# Patient Record
Sex: MALE | Race: WHITE | NOT HISPANIC OR LATINO | URBAN - METROPOLITAN AREA
[De-identification: names, ages, dates, MRNs, and addresses within clinical notes are randomized per-mention and may not be internally consistent; named-entity substitution may affect disease eponyms.]

---

## 2022-07-21 ENCOUNTER — APPOINTMENT (RX ONLY)
Dept: URBAN - METROPOLITAN AREA CLINIC 153 | Facility: CLINIC | Age: 47
Setting detail: DERMATOLOGY
End: 2022-07-21

## 2022-07-21 DIAGNOSIS — Z71.89 OTHER SPECIFIED COUNSELING: ICD-10-CM

## 2022-07-21 DIAGNOSIS — L73.2 HIDRADENITIS SUPPURATIVA: ICD-10-CM | Status: INADEQUATELY CONTROLLED

## 2022-07-21 PROCEDURE — ? TREATMENT REGIMEN

## 2022-07-21 PROCEDURE — ? PRESCRIPTION

## 2022-07-21 PROCEDURE — 99204 OFFICE O/P NEW MOD 45 MIN: CPT

## 2022-07-21 PROCEDURE — ? ADDITIONAL NOTES

## 2022-07-21 PROCEDURE — ? COUNSELING

## 2022-07-21 RX ORDER — PREDNISONE 10 MG/1
1 TABLET ORAL QD
Qty: 30 | Refills: 0 | Status: ERX | COMMUNITY
Start: 2022-07-21

## 2022-07-21 RX ORDER — DOXYCYCLINE 100 MG/1
1 CAPSULE ORAL BID
Qty: 28 | Refills: 0 | Status: ERX | COMMUNITY
Start: 2022-07-21

## 2022-07-21 RX ORDER — MUPIROCIN 20 MG/G
1 OINTMENT TOPICAL BID
Qty: 22 | Refills: 2 | Status: ERX | COMMUNITY
Start: 2022-07-21

## 2022-07-21 RX ADMIN — DOXYCYCLINE 1: 100 CAPSULE ORAL at 00:00

## 2022-07-21 RX ADMIN — PREDNISONE 1: 10 TABLET ORAL at 00:00

## 2022-07-21 RX ADMIN — MUPIROCIN 1: 20 OINTMENT TOPICAL at 00:00

## 2022-07-21 ASSESSMENT — LOCATION ZONE DERM
LOCATION ZONE: TRUNK
LOCATION ZONE: SCROTUM
LOCATION ZONE: GENITALIA
LOCATION ZONE: ANUS
LOCATION ZONE: PERINEUM
LOCATION ZONE: AXILLAE

## 2022-07-21 ASSESSMENT — LOCATION SIMPLE DESCRIPTION DERM
LOCATION SIMPLE: LEFT AXILLARY VAULT
LOCATION SIMPLE: RIGHT SCROTUM
LOCATION SIMPLE: PERINEUM
LOCATION SIMPLE: ANUS
LOCATION SIMPLE: RIGHT AXILLARY VAULT
LOCATION SIMPLE: GENITALIA
LOCATION SIMPLE: GROIN
LOCATION SIMPLE: GLUTEAL CLEFT
LOCATION SIMPLE: LEFT SCROTUM
LOCATION SIMPLE: LEFT UPPER BACK

## 2022-07-21 ASSESSMENT — LOCATION DETAILED DESCRIPTION DERM
LOCATION DETAILED: INFERIOR PERIANAL SKIN
LOCATION DETAILED: LEFT INFERIOR MEDIAL SCROTUM
LOCATION DETAILED: RIGHT INFERIOR MEDIAL SCROTUM
LOCATION DETAILED: GENITALIA
LOCATION DETAILED: LEFT INGUINAL CREASE
LOCATION DETAILED: RIGHT AXILLARY VAULT
LOCATION DETAILED: LEFT MEDIAL UPPER BACK
LOCATION DETAILED: RIGHT PERIANAL SKIN
LOCATION DETAILED: GLUTEAL CLEFT
LOCATION DETAILED: PERINEUM
LOCATION DETAILED: LEFT AXILLARY VAULT
LOCATION DETAILED: SUPRAPUBIC SKIN

## 2022-07-21 NOTE — PROCEDURE: TREATMENT REGIMEN
Initiate Treatment: doxycycline monohydrate 100 mg capsule PO Dose: 1 Frequency: BID Sig: Take one cap twice daily for 2 weeks\\nQuantity: 28 Capsule\\nmupirocin 2 % topical ointment TP Dose: 1 Frequency: BID Si\\nQuantity: 22 g Days Supply: 30\\nprednisone 10 mg tablet PO Dose: 1 Frequency: qd Sig: Take is to take 1 tablet a day x 4 days\\n3 tablets x 3 days\\n2 tablets x 2 days\\nand 1 tablet x 1 day\\nQuantity: 30 Tablet

## 2022-07-21 NOTE — HPI: RASH (HIDRADENITIS SUPPURATIVA)
How Severe Is It?: severe
Is This A New Presentation, Or A Follow-Up?: Rash
Additional History: Patient stated that he underwent clinical trials for a medication he does not know the name of with Dr. Shane Tran at [facility] in [place] but that he had a personal emergency and could not complete treatment. Patient also explains that he underwent several surgeries for the bilateral axilla as a teenager. Patient currently only uses Hibiclens wash and BC powder as a treatment, he does admit that he is a current smoker and that his pain level is a 10 of 10

## 2022-07-21 NOTE — PROCEDURE: TREATMENT REGIMEN
Plan: Patient has been instructed to follow up with Dr. Cornell in [place]  and has also been given the information for Dr. Tylor [facility]. All information will be forwarded to the above mentioned providers, and patient has been instructed to contact our office with any questions or concerns. Plan: Patient has been instructed to follow up with Dr. Cornell in [place]  and has also been given the information for Dr. Tylor [facility]. All information will be forwarded to the above mentioned providers, and patient has been instructed to contact our office with any questions or concerns.

## 2022-08-18 ENCOUNTER — APPOINTMENT (RX ONLY)
Dept: URBAN - METROPOLITAN AREA CLINIC 151 | Facility: CLINIC | Age: 47
Setting detail: DERMATOLOGY
End: 2022-08-18

## 2022-08-18 DIAGNOSIS — Z79.899 OTHER LONG TERM (CURRENT) DRUG THERAPY: ICD-10-CM

## 2022-08-18 DIAGNOSIS — L73.2 HIDRADENITIS SUPPURATIVA: ICD-10-CM | Status: INADEQUATELY CONTROLLED

## 2022-08-18 PROCEDURE — ? PRESCRIPTION MEDICATION MANAGEMENT

## 2022-08-18 PROCEDURE — ? ORDER TESTS

## 2022-08-18 PROCEDURE — 99214 OFFICE O/P EST MOD 30 MIN: CPT

## 2022-08-18 PROCEDURE — ? PRESCRIPTION

## 2022-08-18 PROCEDURE — ? COUNSELING

## 2022-08-18 PROCEDURE — ? HIGH RISK MEDICATION MONITORING

## 2022-08-18 RX ORDER — DOXYCYCLINE HYCLATE 100 MG/1
CAPSULE, GELATIN COATED ORAL BID
Qty: 60 | Refills: 5 | Status: ERX | COMMUNITY
Start: 2022-08-18

## 2022-08-18 RX ADMIN — DOXYCYCLINE HYCLATE: 100 CAPSULE, GELATIN COATED ORAL at 00:00

## 2022-08-18 ASSESSMENT — LOCATION ZONE DERM
LOCATION ZONE: GENITALIA
LOCATION ZONE: TRUNK
LOCATION ZONE: LEG
LOCATION ZONE: AXILLAE

## 2022-08-18 ASSESSMENT — LOCATION SIMPLE DESCRIPTION DERM
LOCATION SIMPLE: GROIN
LOCATION SIMPLE: GLUTEAL CLEFT
LOCATION SIMPLE: RIGHT AXILLARY VAULT
LOCATION SIMPLE: GENITALIA
LOCATION SIMPLE: ABDOMEN
LOCATION SIMPLE: LEFT THIGH
LOCATION SIMPLE: LEFT AXILLARY VAULT

## 2022-08-18 ASSESSMENT — LOCATION DETAILED DESCRIPTION DERM
LOCATION DETAILED: LEFT AXILLARY VAULT
LOCATION DETAILED: GENITALIA
LOCATION DETAILED: SUPRAPUBIC SKIN
LOCATION DETAILED: LEFT ANTERIOR PROXIMAL THIGH
LOCATION DETAILED: GLUTEAL CLEFT
LOCATION DETAILED: PERIUMBILICAL SKIN
LOCATION DETAILED: RIGHT AXILLARY VAULT

## 2022-08-18 NOTE — PROCEDURE: ORDER TESTS
Performing Laboratory: [id number]
Bill For Surgical Tray: no
Billing Type: Third-Party Bill
Expected Date Of Service: 08/18/2022
Clinical Notes (To The Lab): L73.2

## 2022-08-18 NOTE — PROCEDURE: HIGH RISK MEDICATION MONITORING
palpitations Clofazimine Pregnancy And Lactation Text: This medication is Pregnancy Category C and isn't considered safe during pregnancy. It is excreted in breast milk.

## 2022-08-18 NOTE — PROCEDURE: PRESCRIPTION MEDICATION MANAGEMENT
Render In Strict Bullet Format?: No
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 Patient states he has had surgery to remove tracts from the gluteal cleft as well. Patient states he has tried and failed doxycycline, clindamycin, and hibiclens with no success. Advised patient he is a good candidate for Humira. Advised patient Humira is a biologic and is an immunosuppressive medication but will help control inflammation. Patient will need lab monitoring and TB test before starting any biologic.
Detail Level: Zone

## 2023-03-07 ENCOUNTER — APPOINTMENT (RX ONLY)
Dept: URBAN - METROPOLITAN AREA CLINIC 151 | Facility: CLINIC | Age: 48
Setting detail: DERMATOLOGY
End: 2023-03-07

## 2023-03-07 DIAGNOSIS — L73.2 HIDRADENITIS SUPPURATIVA: ICD-10-CM | Status: INADEQUATELY CONTROLLED

## 2023-03-07 DIAGNOSIS — F17.200 NICOTINE DEPENDENCE, UNSPECIFIED, UNCOMPLICATED: ICD-10-CM

## 2023-03-07 PROCEDURE — ? HUMIRA INITIATION

## 2023-03-07 PROCEDURE — ? COUNSELING

## 2023-03-07 PROCEDURE — ? PRESCRIPTION MEDICATION MANAGEMENT

## 2023-03-07 PROCEDURE — 99214 OFFICE O/P EST MOD 30 MIN: CPT

## 2023-03-07 PROCEDURE — ? PRESCRIPTION

## 2023-03-07 PROCEDURE — ? FULL BODY SKIN EXAM - DECLINED

## 2023-03-07 RX ORDER — ADALIMUMAB 80MG/0.8ML
KIT SUBCUTANEOUS
Qty: 3 | Refills: 1 | Status: CANCELLED | COMMUNITY
Start: 2023-03-07

## 2023-03-07 RX ADMIN — ADALIMUMAB: KIT at 00:00

## 2023-03-07 ASSESSMENT — LOCATION DETAILED DESCRIPTION DERM
LOCATION DETAILED: RIGHT LATERAL INFERIOR CHEST
LOCATION DETAILED: SUPRAPUBIC SKIN
LOCATION DETAILED: LEFT LATERAL INFERIOR CHEST

## 2023-03-07 ASSESSMENT — LOCATION SIMPLE DESCRIPTION DERM
LOCATION SIMPLE: CHEST
LOCATION SIMPLE: GROIN

## 2023-03-07 ASSESSMENT — LOCATION ZONE DERM: LOCATION ZONE: TRUNK

## 2023-03-07 ASSESSMENT — HURLEY STAGE
IN YOUR EXPERIENCE, AMONG ALL PATIENTS YOU HAVE SEEN WITH THIS CONDITION, HOW SEVERE IS THIS PATIENT'S CONDITION?: HURLEY STAGE III: MULTIPLE INTERCONNECTED SINUS TRACTS AND ABSCESSES THROUGHOUT AN ENTIRE AREA

## 2023-03-07 NOTE — PROCEDURE: HUMIRA INITIATION
Is Cyclosporine Contraindicated?: No
Diagnosis (Required): Psoriasis
Detail Level: Zone
Humira Monitoring Guidelines: A yearly test for tuberculosis is required while taking Humira.
Humira Dosing: 80 mg SC day 0, 40 mg SC day 7, then 40 mg SC every other week
Pregnancy And Lactation Warning Text: This medication is Pregnancy Category B and is considered safe during pregnancy. It is unknown if this medication is excreted in breast milk.

## 2023-03-07 NOTE — PROCEDURE: PRESCRIPTION MEDICATION MANAGEMENT
Initiate Treatment: Humira
Continue Regimen: Doxycycline as directed
Render In Strict Bullet Format?: No
Detail Level: Zone

## 2023-03-10 ENCOUNTER — RX ONLY (OUTPATIENT)
Age: 48
Setting detail: RX ONLY
End: 2023-03-10

## 2023-03-10 RX ORDER — ADALIMUMAB 80MG/0.8ML
KIT SUBCUTANEOUS
Qty: 3 | Refills: 1 | Status: ERX

## 2023-03-13 ENCOUNTER — RX ONLY (OUTPATIENT)
Age: 48
Setting detail: RX ONLY
End: 2023-03-13

## 2023-03-13 RX ORDER — ADALIMUMAB 80MG/0.8ML
KIT SUBCUTANEOUS
Qty: 3 | Refills: 1 | Status: ERX

## 2023-03-21 ENCOUNTER — RX ONLY (OUTPATIENT)
Age: 48
Setting detail: RX ONLY
End: 2023-03-21

## 2023-03-21 RX ORDER — ADALIMUMAB 80MG/0.8ML
KIT SUBCUTANEOUS
Qty: 4 | Refills: 6 | Status: ERX

## 2023-07-27 ENCOUNTER — APPOINTMENT (RX ONLY)
Dept: URBAN - METROPOLITAN AREA CLINIC 151 | Facility: CLINIC | Age: 48
Setting detail: DERMATOLOGY
End: 2023-07-27

## 2023-07-27 DIAGNOSIS — L73.2 HIDRADENITIS SUPPURATIVA: ICD-10-CM

## 2023-07-27 PROCEDURE — ? COUNSELING

## 2023-07-27 PROCEDURE — 99214 OFFICE O/P EST MOD 30 MIN: CPT

## 2023-07-27 PROCEDURE — ? PRESCRIPTION MEDICATION MANAGEMENT

## 2023-07-27 ASSESSMENT — LOCATION SIMPLE DESCRIPTION DERM
LOCATION SIMPLE: CHEST
LOCATION SIMPLE: RIGHT THIGH
LOCATION SIMPLE: GROIN

## 2023-07-27 ASSESSMENT — LOCATION DETAILED DESCRIPTION DERM
LOCATION DETAILED: LEFT LATERAL INFERIOR CHEST
LOCATION DETAILED: RIGHT LATERAL INFERIOR CHEST
LOCATION DETAILED: RIGHT MEDIAL POSTERIOR THIGH
LOCATION DETAILED: SUPRAPUBIC SKIN

## 2023-07-27 ASSESSMENT — LOCATION ZONE DERM
LOCATION ZONE: LEG
LOCATION ZONE: TRUNK

## 2023-07-27 NOTE — PROCEDURE: PRESCRIPTION MEDICATION MANAGEMENT
Initiate Treatment: Humira
Continue Regimen: Doxycycline as directed
Render In Strict Bullet Format?: No
Plan: Patient is taking Humira and states he hasn’t had any improvement. Patient states his mood has been horrible since he started taking Humira. Patient was advised cosentxy can be used to help and patient will be doing so for better results and relief
Detail Level: Zone

## 2024-01-08 RX ORDER — DOXYCYCLINE HYCLATE 100 MG/1
CAPSULE, GELATIN COATED ORAL BID
Qty: 60 | Refills: 0 | Status: ERX